# Patient Record
Sex: MALE | Race: WHITE | Employment: FULL TIME | URBAN - METROPOLITAN AREA
[De-identification: names, ages, dates, MRNs, and addresses within clinical notes are randomized per-mention and may not be internally consistent; named-entity substitution may affect disease eponyms.]

---

## 2017-06-12 ENCOUNTER — APPOINTMENT (OUTPATIENT)
Dept: GENERAL RADIOLOGY | Age: 62
End: 2017-06-12
Attending: EMERGENCY MEDICINE
Payer: COMMERCIAL

## 2017-06-12 ENCOUNTER — HOSPITAL ENCOUNTER (OUTPATIENT)
Age: 62
Discharge: HOME | End: 2017-06-12
Attending: EMERGENCY MEDICINE
Payer: COMMERCIAL

## 2017-06-12 VITALS
WEIGHT: 156 LBS | HEART RATE: 78 BPM | OXYGEN SATURATION: 97 % | SYSTOLIC BLOOD PRESSURE: 108 MMHG | RESPIRATION RATE: 18 BRPM | DIASTOLIC BLOOD PRESSURE: 65 MMHG | TEMPERATURE: 98 F

## 2017-06-12 DIAGNOSIS — R00.2 PALPITATIONS: Primary | ICD-10-CM

## 2017-06-12 PROCEDURE — 99205 OFFICE O/P NEW HI 60 MIN: CPT

## 2017-06-12 PROCEDURE — 71010 XR CHEST AP/PA (1 VIEW) (CPT=71010): CPT | Performed by: EMERGENCY MEDICINE

## 2017-06-12 PROCEDURE — 80047 BASIC METABLC PNL IONIZED CA: CPT

## 2017-06-12 PROCEDURE — 96360 HYDRATION IV INFUSION INIT: CPT

## 2017-06-12 PROCEDURE — 84484 ASSAY OF TROPONIN QUANT: CPT

## 2017-06-12 PROCEDURE — 85025 COMPLETE CBC W/AUTO DIFF WBC: CPT | Performed by: EMERGENCY MEDICINE

## 2017-06-12 PROCEDURE — 93010 ELECTROCARDIOGRAM REPORT: CPT

## 2017-06-12 PROCEDURE — 93005 ELECTROCARDIOGRAM TRACING: CPT

## 2017-06-12 RX ORDER — SODIUM CHLORIDE 9 MG/ML
1000 INJECTION, SOLUTION INTRAVENOUS ONCE
Status: COMPLETED | OUTPATIENT
Start: 2017-06-12 | End: 2017-06-12

## 2017-06-12 NOTE — ED INITIAL ASSESSMENT (HPI)
Patient states that at 11 am today he developed a sudden onset of heart racing that lasted for about 2 hours. Patient states that he felt lightheaded at times. Denies any chest pain , shortness of breath, nausea or chest pain.  Patient states that after arlyn

## 2017-06-12 NOTE — ED PROVIDER NOTES
Patient Seen in: THE [facility] END    History   Patient presents with:  Arrythmia/Palpitations (cardiovascular)    Stated Complaint: elevated heart rate    HPI    The patient is a 77-year-old previously healthy male presenting to the i Current:/88 mmHg  Pulse 89  Temp(Src) 97.6 °F (36.4 °C) (Oral)  Resp 16  Wt 70.761 kg  SpO2 97%        Physical Exam    General: Very well-appearing conversant pleasant adult male who is alert and oriented in no distress. Eyes: EOMI, PERRLA. saline. [facility]     Xr Chest Ap/pa (1 View) (cpt=71010)    6/12/2017  PROCEDURE:  XR CHEST AP/PA (1 VIEW) (CPT=71010)  TECHNIQUE:  AP chest radiograph was obtained. COMPARISON:  None.   INDICATIONS:  elevated heart rate  PATIENT STATED HISTORY: (As transcribe

## 2017-06-20 ENCOUNTER — PRIOR ORIGINAL RECORDS (OUTPATIENT)
Dept: OTHER | Age: 62
End: 2017-06-20

## 2017-06-27 ENCOUNTER — MYAURORA ACCOUNT LINK (OUTPATIENT)
Dept: OTHER | Age: 62
End: 2017-06-27

## 2017-08-02 ENCOUNTER — MYAURORA ACCOUNT LINK (OUTPATIENT)
Dept: OTHER | Age: 62
End: 2017-08-02

## 2017-08-02 ENCOUNTER — HOSPITAL ENCOUNTER (OUTPATIENT)
Dept: CV DIAGNOSTICS | Age: 62
Discharge: HOME | End: 2017-08-02
Attending: INTERNAL MEDICINE
Payer: COMMERCIAL

## 2017-08-02 DIAGNOSIS — R00.2 PALPITATIONS: ICD-10-CM

## 2017-08-03 ENCOUNTER — PRIOR ORIGINAL RECORDS (OUTPATIENT)
Dept: OTHER | Age: 62
End: 2017-08-03

## 2017-08-18 ENCOUNTER — PRIOR ORIGINAL RECORDS (OUTPATIENT)
Dept: OTHER | Age: 62
End: 2017-08-18

## 2017-09-12 ENCOUNTER — MYAURORA ACCOUNT LINK (OUTPATIENT)
Dept: OTHER | Age: 62
End: 2017-09-12

## 2017-09-12 ENCOUNTER — PRIOR ORIGINAL RECORDS (OUTPATIENT)
Dept: OTHER | Age: 62
End: 2017-09-12

## 2019-02-28 VITALS
DIASTOLIC BLOOD PRESSURE: 78 MMHG | HEART RATE: 62 BPM | WEIGHT: 161 LBS | HEIGHT: 65 IN | SYSTOLIC BLOOD PRESSURE: 114 MMHG | BODY MASS INDEX: 26.82 KG/M2

## 2019-02-28 VITALS
DIASTOLIC BLOOD PRESSURE: 72 MMHG | HEART RATE: 58 BPM | HEIGHT: 65 IN | BODY MASS INDEX: 26.99 KG/M2 | SYSTOLIC BLOOD PRESSURE: 124 MMHG | WEIGHT: 162 LBS

## (undated) NOTE — ED AVS SNAPSHOT
[facility] in [address]    Phone:  [phone]    Fax:  [address] [facility]   MRN: [id number]    Department:  THE [facility] in [employer]   Date of Visit:  6/12/2017 Or call [phone]    If you have any problems with your follow-up, please call our  at [phone]. Si usted tiene algun problema con rashid sequimiento, por favor llame a nuestro adminstrador de casos al [phone].     Expect t Anastasia Mcgill [address] ([address]) [place] ([address] [id number] [facility]9   [facility] [address] ([address] [facility]) [address]  (Do Approved by: Mayur Cheung MD              Narrative:    PROCEDURE:  XR CHEST AP/PA (1 VIEW) (CPT=71010)     TECHNIQUE:  AP chest radiograph was obtained. COMPARISON:  None.      INDICATIONS:  elevated heart rate     PATIENT STATED HISTORY: (As transcribe